# Patient Record
Sex: FEMALE | Race: OTHER | NOT HISPANIC OR LATINO
[De-identification: names, ages, dates, MRNs, and addresses within clinical notes are randomized per-mention and may not be internally consistent; named-entity substitution may affect disease eponyms.]

---

## 2019-06-11 ENCOUNTER — FORM ENCOUNTER (OUTPATIENT)
Age: 69
End: 2019-06-11

## 2019-12-17 ENCOUNTER — FORM ENCOUNTER (OUTPATIENT)
Age: 69
End: 2019-12-17

## 2020-06-23 ENCOUNTER — FORM ENCOUNTER (OUTPATIENT)
Age: 70
End: 2020-06-23

## 2021-01-28 PROBLEM — Z00.00 ENCOUNTER FOR PREVENTIVE HEALTH EXAMINATION: Status: ACTIVE | Noted: 2021-01-28

## 2021-01-29 DIAGNOSIS — Z80.7 FAMILY HISTORY OF OTHER MALIGNANT NEOPLASMS OF LYMPHOID, HEMATOPOIETIC AND RELATED TISSUES: ICD-10-CM

## 2021-01-29 DIAGNOSIS — Z87.898 PERSONAL HISTORY OF OTHER SPECIFIED CONDITIONS: ICD-10-CM

## 2021-01-29 DIAGNOSIS — Z87.891 PERSONAL HISTORY OF NICOTINE DEPENDENCE: ICD-10-CM

## 2021-01-29 DIAGNOSIS — J45.909 UNSPECIFIED ASTHMA, UNCOMPLICATED: ICD-10-CM

## 2021-01-29 DIAGNOSIS — Z78.9 OTHER SPECIFIED HEALTH STATUS: ICD-10-CM

## 2021-01-29 RX ORDER — SULINDAC 200 MG/1
TABLET ORAL
Refills: 0 | Status: ACTIVE | COMMUNITY

## 2021-01-29 RX ORDER — ALPRAZOLAM 2 MG/1
TABLET ORAL
Refills: 0 | Status: ACTIVE | COMMUNITY

## 2021-01-29 RX ORDER — FLUTICASONE PROPION/SALMETEROL 500-50 MCG
BLISTER, WITH INHALATION DEVICE INHALATION
Refills: 0 | Status: ACTIVE | COMMUNITY

## 2021-01-30 DIAGNOSIS — Z92.29 PERSONAL HISTORY OF OTHER DRUG THERAPY: ICD-10-CM

## 2021-02-02 ENCOUNTER — APPOINTMENT (OUTPATIENT)
Dept: BREAST CENTER | Facility: CLINIC | Age: 71
End: 2021-02-02

## 2021-02-08 ENCOUNTER — APPOINTMENT (OUTPATIENT)
Dept: BREAST CENTER | Facility: CLINIC | Age: 71
End: 2021-02-08
Payer: MEDICARE

## 2021-02-08 VITALS
SYSTOLIC BLOOD PRESSURE: 140 MMHG | BODY MASS INDEX: 20.49 KG/M2 | HEART RATE: 78 BPM | WEIGHT: 120 LBS | HEIGHT: 64 IN | DIASTOLIC BLOOD PRESSURE: 80 MMHG

## 2021-02-08 DIAGNOSIS — N60.12 DIFFUSE CYSTIC MASTOPATHY OF LEFT BREAST: ICD-10-CM

## 2021-02-08 DIAGNOSIS — Z87.09 PERSONAL HISTORY OF OTHER DISEASES OF THE RESPIRATORY SYSTEM: ICD-10-CM

## 2021-02-08 PROCEDURE — 99072 ADDL SUPL MATRL&STAF TM PHE: CPT

## 2021-02-08 PROCEDURE — 99214 OFFICE O/P EST MOD 30 MIN: CPT

## 2021-02-08 RX ORDER — TAMOXIFEN CITRATE 20 MG/1
20 TABLET, FILM COATED ORAL
Refills: 0 | Status: ACTIVE | COMMUNITY

## 2021-02-08 NOTE — PAST MEDICAL HISTORY
[Postmenopausal] : The patient is postmenopausal [Menarche Age ____] : age at menarche was [unfilled] [Menopause Age____] : age at menopause was [unfilled] [History of Hormone Replacement Treatment] : has no history of hormone replacement treatment [Total Preg ___] : G[unfilled] [Live Births ___] : P[unfilled]  [Age At Live Birth ___] : Age at live birth: [unfilled]

## 2021-02-08 NOTE — ASSESSMENT
[FreeTextEntry1] : The patient is a 71-year-old G1, P1 postmenopausal white female with some Ashkenazi Druze heritage on her paternal side.  She has no family history of breast or ovarian cancer but has a questionable history of Hodgkin's lymphoma in her paternal uncle.  She underwent menarche at age 13 and had her first child at age 36.  She underwent menopause at age 55 and never took any hormone replacement therapy.  She underwent a screening mammography and ultrasound on June 6, 2018 at [facility] which showed a new density measuring 8 mm towards the upper outer aspect of the right breast.  Ultrasound showed a 9 x 9 x 5 mm density in the right breast 10:00 region 12 cm from the nipple.  An ultrasound-guided core biopsy performed on June 12, 2018 came back as a  moderately differentiated invasive duct cancer which was ER positive at 99%, [place] positive at 84%, and HER-2/na negative.  She underwent a breast MRI on June 20, 2018 which showed localized disease in the right breast with no suspicious adenopathy.  She was seen by Dr. Padgett preoperatively for entrance in the TARGIT US trial but refused.  She underwent a right breast partial mastectomy and sentinel lymph node biopsy on July 3, 2018 and final pathology showed an 8 mm well differentiated invasive duct cancer with 2 negative sentinel lymph nodes and free margins.  This remained ER/NY positive HER-2/na negative and was a pathologic prognostic stage IA breast cancer.  She saw Dr. Lopez for a medical oncology evaluation and ended up following up with Dr. Roshni Balderas and saw Dr. Urbina who gave her the option of no radiation and she was just placed on tamoxifen.  On exam today, she has no evidence of recurrence in the right breast and left breast is negative.  She underwent her last bilateral mammography and ultrasound at [place] on June 24, 2020 just showing postsurgical changes in the right breast.  She was reassured and should follow-up again in 6 months and will be due for her bilateral mammography and ultrasound again around that time in July 2021 she should remain on tamoxifen.

## 2021-02-08 NOTE — REASON FOR VISIT
[Follow-Up: _____] : a [unfilled] follow-up visit [FreeTextEntry1] : She comes in with a history of a right breast upper outer quadrant partial mastectomy and sentinel lymph node biopsy performed on July 3, 2018.  Final pathology showed an 8 mm well differentiated invasive duct cancer with 2 negative lymph nodes and free margins which was ER/WV positive HER-2/na negative making this a T1b N0 M0 stage IA breast cancer.  She was placed on tamoxifen.

## 2021-02-08 NOTE — PHYSICAL EXAM
[Normocephalic] : normocephalic [Atraumatic] : atraumatic [Supple] : supple [EOMI] : extra ocular movement intact [No Supraclavicular Adenopathy] : no supraclavicular adenopathy [No Cervical Adenopathy] : no cervical adenopathy [Examined in the supine and seated position] : examined in the supine and seated position [Normal Sinus Rhythm] : normal sinus rhythm [Symmetrical] : symmetrical [No dominant masses] : no dominant masses in right breast  [No dominant masses] : no dominant masses left breast [No Nipple Retraction] : no left nipple retraction [No Nipple Discharge] : no left nipple discharge [Breast Mass Right Breast ___cm] : no masses [Breast Mass Left Breast ___cm] : no masses [Breast Nipple Inversion] : nipples not inverted [Breast Nipple Retraction] : nipples not retracted [Breast Nipple Flattening] : nipples not flattened [Breast Nipple Fissures] : nipples not fissured [No Axillary Lymphadenopathy] : no left axillary lymphadenopathy [Soft] : abdomen soft [Normal Bowel Sounds] : normal bowel sounds  [No Edema] : no edema [No Rashes] : no rashes [No Ulceration] : no ulceration [de-identified] : On exam, she has a well-healed wide excision scar in the upper outer aspect of the right breast.  She has ptotic C-cup breasts.  She has no evidence of recurrence.  She has no axillary, supraclavicular, or cervical adenopathy. [de-identified] : Well-healed right breast upper outer quadrant wide excision scar

## 2021-02-08 NOTE — HISTORY OF PRESENT ILLNESS
[FreeTextEntry1] : The patient is a 71-year-old G1, P1 postmenopausal white female with some Ashkenazi Shinto heritage on her paternal side.  She has no family history of breast or ovarian cancer but has a questionable history of Hodgkin's lymphoma in her paternal uncle.  She underwent menarche at age 13 and had her first child at age 36.  She underwent menopause at age 55 and never took any hormone replacement therapy.  She underwent a screening mammography and ultrasound on June 6, 2018 at [facility] which showed a new density measuring 8 mm towards the upper outer aspect of the right breast.  Ultrasound showed a 9 x 9 x 5 mm density in the right breast 10:00 region 12 cm from the nipple.  An ultrasound-guided core biopsy performed on June 12, 2018 came back as a  moderately differentiated invasive duct cancer which was ER positive at 99%, ME positive at 84%, and HER-2/na negative.  She underwent a breast MRI on June 20, 2018 which showed localized disease in the right breast with no suspicious adenopathy.  She was seen by Dr. Padgett preoperatively for entrance in the TARGIT US trial but refused.  She underwent a right breast partial mastectomy and sentinel lymph node biopsy on July 3, 2018 and final pathology showed an 8 mm well differentiated invasive duct cancer with 2 negative sentinel lymph nodes and free margins.  This remained ER/ME positive HER-2/na negative and was a pathologic prognostic stage IA breast cancer.  She saw Dr. Lopez for a medical oncology evaluation and ended up following up with Dr. Roshni Balderas and saw Dr. Urbina who gave her the option of no radiation and she was just placed on tamoxifen.

## 2021-07-21 ENCOUNTER — RESULT REVIEW (OUTPATIENT)
Age: 71
End: 2021-07-21

## 2021-07-21 ENCOUNTER — APPOINTMENT (OUTPATIENT)
Dept: BREAST CENTER | Facility: CLINIC | Age: 71
End: 2021-07-21
Payer: MEDICARE

## 2021-07-21 PROCEDURE — 99072 ADDL SUPL MATRL&STAF TM PHE: CPT

## 2021-07-21 PROCEDURE — 99213 OFFICE O/P EST LOW 20 MIN: CPT

## 2021-07-21 NOTE — HISTORY OF PRESENT ILLNESS
[FreeTextEntry1] : The patient is a 71-year-old G1, P1 postmenopausal white female with some Ashkenazi Religious heritage on her paternal side.  She has no family history of breast or ovarian cancer but has a questionable history of Hodgkin's lymphoma in her paternal uncle.  She underwent menarche at age 13 and had her first child at age 36.  She underwent menopause at age 55 and never took any hormone replacement therapy.  She underwent a screening mammography and ultrasound on June 6, 2018 at [facility] which showed a new density measuring 8 mm towards the upper outer aspect of the right breast.  Ultrasound showed a 9 x 9 x 5 mm density in the right breast 10:00 region 12 cm from the nipple.  An ultrasound-guided core biopsy performed on June 12, 2018 came back as a  moderately differentiated invasive duct cancer which was ER positive at 99%, DE positive at 84%, and HER-2/na negative.  She underwent a breast MRI on June 20, 2018 which showed localized disease in the right breast with no suspicious adenopathy.  She was seen by Dr. Padgett preoperatively for entrance in the TARGIT US trial but refused.  She underwent a right breast partial mastectomy and sentinel lymph node biopsy on July 3, 2018 and final pathology showed an 8 mm well differentiated invasive duct cancer with 2 negative sentinel lymph nodes and free margins.  This remained ER/DE positive HER-2/na negative and was a pathologic prognostic stage IA breast cancer.  She saw Dr. Lopez for a medical oncology evaluation and ended up following up with Dr. Roshni Balderas and saw Dr. Urbina who gave her the option of no radiation and she was just placed on tamoxifen.

## 2021-07-21 NOTE — ASSESSMENT
[FreeTextEntry1] : The patient is a 71-year-old G1, P1 postmenopausal white female with some Ashkenazi Sabianism heritage on her paternal side.  She has no family history of breast or ovarian cancer but has a questionable history of Hodgkin's lymphoma in her paternal uncle.  She underwent menarche at age 13 and had her first child at age 36.  She underwent menopause at age 55 and never took any hormone replacement therapy.  She underwent a screening mammography and ultrasound on June 6, 2018 at [facility] which showed a new density measuring 8 mm towards the upper outer aspect of the right breast.  Ultrasound showed a 9 x 9 x 5 mm density in the right breast 10:00 region 12 cm from the nipple.  An ultrasound-guided core biopsy performed on June 12, 2018 came back as a  moderately differentiated invasive duct cancer which was ER positive at 99%, ME positive at 84%, and HER-2/na negative.  She underwent a breast MRI on June 20, 2018 which showed localized disease in the right breast with no suspicious adenopathy.  She was seen by Dr. Padgett preoperatively for entrance in the TARGIT US trial but refused.  She underwent a right breast partial mastectomy and sentinel lymph node biopsy on July 3, 2018 and final pathology showed an 8 mm well differentiated invasive duct cancer with 2 negative sentinel lymph nodes and free margins.  This remained ER/ME positive HER-2/na negative and was a pathologic prognostic stage IA breast cancer.  She saw Dr. Lopez for a medical oncology evaluation and ended up following up with Dr. Roshni Balderas and saw Dr. Urbina who gave her the option of no radiation and she was just placed on tamoxifen.  On exam today, she has no evidence of recurrence in the right breast and left breast is negative.  She underwent her last bilateral mammography and ultrasound today here at [place] on July 21, 2021 showing the postop changes in the right breast and no suspicious findings.  She was reassured and should follow-up again in 6 months and will be due for her bilateral mammography and ultrasound again in July 2022.  She should remain on tamoxifen.

## 2021-07-21 NOTE — REASON FOR VISIT
[Follow-Up: _____] : a [unfilled] follow-up visit [FreeTextEntry1] : She comes in with a history of a right breast upper outer quadrant partial mastectomy and sentinel lymph node biopsy performed on July 3, 2018.  Final pathology showed an 8 mm well differentiated invasive duct cancer with 2 negative lymph nodes and free margins which was ER/OK positive HER-2/na negative making this a T1b N0 M0 stage IA breast cancer.  She was placed on tamoxifen.

## 2021-07-21 NOTE — PHYSICAL EXAM
[Normocephalic] : normocephalic [Atraumatic] : atraumatic [EOMI] : extra ocular movement intact [Supple] : supple [No Supraclavicular Adenopathy] : no supraclavicular adenopathy [No Cervical Adenopathy] : no cervical adenopathy [Normal Sinus Rhythm] : normal sinus rhythm [Examined in the supine and seated position] : examined in the supine and seated position [Symmetrical] : symmetrical [No dominant masses] : no dominant masses in right breast  [No dominant masses] : no dominant masses left breast [No Nipple Retraction] : no left nipple retraction [No Nipple Discharge] : no left nipple discharge [Breast Mass Right Breast ___cm] : no masses [Breast Mass Left Breast ___cm] : no masses [No Axillary Lymphadenopathy] : no left axillary lymphadenopathy [No Edema] : no edema [No Rashes] : no rashes [No Ulceration] : no ulceration [Breast Nipple Inversion] : nipples not inverted [Breast Nipple Retraction] : nipples not retracted [Breast Nipple Flattening] : nipples not flattened [Breast Nipple Fissures] : nipples not fissured [Breast Abnormal Lactation (Galactorrhea)] : no galactorrhea [Breast Abnormal Secretion Bloody Fluid] : no bloody discharge [Breast Abnormal Secretion Serous Fluid] : no serous discharge [Breast Abnormal Secretion Opalescent Fluid] : no milky discharge [de-identified] : On exam, she has a well-healed wide excision scar in the upper outer aspect of the right breast.  She has ptotic C-cup breasts.  She has no evidence of recurrence.  She has no axillary, supraclavicular, or cervical adenopathy. [de-identified] : Well-healed right breast upper outer quadrant wide excision scar

## 2022-01-01 NOTE — PHYSICAL EXAM
[Normocephalic] : normocephalic [Atraumatic] : atraumatic [EOMI] : extra ocular movement intact [Supple] : supple [No Supraclavicular Adenopathy] : no supraclavicular adenopathy [No Cervical Adenopathy] : no cervical adenopathy [Normal Sinus Rhythm] : normal sinus rhythm [Examined in the supine and seated position] : examined in the supine and seated position [Symmetrical] : symmetrical [No dominant masses] : no dominant masses in right breast  [No dominant masses] : no dominant masses left breast [No Nipple Retraction] : no left nipple retraction [No Nipple Discharge] : no left nipple discharge [Breast Mass Right Breast ___cm] : no masses [Breast Mass Left Breast ___cm] : no masses [Breast Nipple Inversion] : nipples not inverted [Breast Nipple Retraction] : nipples not retracted [Breast Nipple Flattening] : nipples not flattened [Breast Nipple Fissures] : nipples not fissured [Breast Abnormal Lactation (Galactorrhea)] : no galactorrhea [Breast Abnormal Secretion Bloody Fluid] : no bloody discharge [Breast Abnormal Secretion Serous Fluid] : no serous discharge [Breast Abnormal Secretion Opalescent Fluid] : no milky discharge [No Axillary Lymphadenopathy] : no left axillary lymphadenopathy [No Edema] : no edema [No Rashes] : no rashes [No Ulceration] : no ulceration [de-identified] : On exam, she has a well-healed wide excision scar in the upper outer aspect of the right breast.  She has ptotic C-cup breasts.  She has no evidence of recurrence.  She has no axillary, supraclavicular, or cervical adenopathy. [de-identified] : Well-healed right breast upper outer quadrant wide excision scar

## 2022-01-01 NOTE — REASON FOR VISIT
[Follow-Up: _____] : a [unfilled] follow-up visit [FreeTextEntry1] : She comes in with a history of a right breast upper outer quadrant partial mastectomy and sentinel lymph node biopsy performed on July 3, 2018.  Final pathology showed an 8 mm well differentiated invasive duct cancer with 2 negative lymph nodes and free margins which was ER/VA positive HER-2/na negative making this a T1b N0 M0 stage IA breast cancer.  She was placed on tamoxifen.

## 2022-01-01 NOTE — HISTORY OF PRESENT ILLNESS
[FreeTextEntry1] : The patient is a 71-year-old G1, P1 postmenopausal white female with some Ashkenazi Jainism heritage on her paternal side.  She has no family history of breast or ovarian cancer but has a questionable history of Hodgkin's lymphoma in her paternal uncle.  She underwent menarche at age 13 and had her first child at age 36.  She underwent menopause at age 55 and never took any hormone replacement therapy.  She underwent a screening mammography and ultrasound on June 6, 2018 at [facility] which showed a new density measuring 8 mm towards the upper outer aspect of the right breast.  Ultrasound showed a 9 x 9 x 5 mm density in the right breast 10:00 region 12 cm from the nipple.  An ultrasound-guided core biopsy performed on June 12, 2018 came back as a  moderately differentiated invasive duct cancer which was ER positive at 99%, AK positive at 84%, and HER-2/na negative.  She underwent a breast MRI on June 20, 2018 which showed localized disease in the right breast with no suspicious adenopathy.  She was seen by Dr. Padgett preoperatively for entrance in the TARGIT US trial but refused.  She underwent a right breast partial mastectomy and sentinel lymph node biopsy on July 3, 2018 and final pathology showed an 8 mm well differentiated invasive duct cancer with 2 negative sentinel lymph nodes and free margins.  This remained ER/AK positive HER-2/na negative and was a pathologic prognostic stage IA breast cancer.  She saw Dr. Lopez for a medical oncology evaluation and ended up following up with Dr. Roshni Balderas and saw Dr. Urbina who gave her the option of no radiation and she was just placed on tamoxifen.

## 2022-01-01 NOTE — ASSESSMENT
[FreeTextEntry1] : The patient is a 71-year-old G1, P1 postmenopausal white female with some Ashkenazi Baptist heritage on her paternal side.  She has no family history of breast or ovarian cancer but has a questionable history of Hodgkin's lymphoma in her paternal uncle.  She underwent menarche at age 13 and had her first child at age 36.  She underwent menopause at age 55 and never took any hormone replacement therapy.  She underwent a screening mammography and ultrasound on June 6, 2018 at [facility] which showed a new density measuring 8 mm towards the upper outer aspect of the right breast.  Ultrasound showed a 9 x 9 x 5 mm density in the right breast 10:00 region 12 cm from the nipple.  An ultrasound-guided core biopsy performed on June 12, 2018 came back as a  moderately differentiated invasive duct cancer which was ER positive at 99%, FL positive at 84%, and HER-2/na negative.  She underwent a breast MRI on June 20, 2018 which showed localized disease in the right breast with no suspicious adenopathy.  She was seen by Dr. Padgett preoperatively for entrance in the TARGIT US trial but refused.  She underwent a right breast partial mastectomy and sentinel lymph node biopsy on July 3, 2018 and final pathology showed an 8 mm well differentiated invasive duct cancer with 2 negative sentinel lymph nodes and free margins.  This remained ER/FL positive HER-2/na negative and was a pathologic prognostic stage IA breast cancer.  She saw Dr. Lopez for a medical oncology evaluation and ended up following up with Dr. Roshni Balderas and saw Dr. Urbina who gave her the option of no radiation and she was just placed on tamoxifen.  On exam today, she has no evidence of recurrence in the right breast and left breast is negative.  She underwent her last bilateral mammography and ultrasound which was reviewed from  July 21, 2021 performed at [place] showing postop changes in the right breast and no suspicious findings.  She was reassured and should follow-up again in 6 months and will be due for her bilateral mammography and ultrasound again in July 2022.  She should remain on tamoxifen.

## 2022-01-03 ENCOUNTER — APPOINTMENT (OUTPATIENT)
Dept: BREAST CENTER | Facility: CLINIC | Age: 72
End: 2022-01-03
Payer: MEDICARE

## 2022-02-23 ENCOUNTER — APPOINTMENT (OUTPATIENT)
Dept: BREAST CENTER | Facility: CLINIC | Age: 72
End: 2022-02-23
Payer: MEDICARE

## 2022-02-23 VITALS — OXYGEN SATURATION: 97 % | HEART RATE: 76 BPM

## 2022-02-23 PROCEDURE — 99213 OFFICE O/P EST LOW 20 MIN: CPT

## 2022-02-23 NOTE — ASSESSMENT
[FreeTextEntry1] : The patient is a 72-year-old G1, P1 postmenopausal white female with some Ashkenazi Scientologist heritage on her paternal side.  She has no family history of breast or ovarian cancer but has a questionable history of Hodgkin's lymphoma in her paternal uncle.  She underwent menarche at age 13 and had her first child at age 36.  She underwent menopause at age 55 and never took any hormone replacement therapy.  She underwent a screening mammography and ultrasound on June 6, 2018 at [facility] which showed a new density measuring 8 mm towards the upper outer aspect of the right breast.  Ultrasound showed a 9 x 9 x 5 mm density in the right breast 10:00 region 12 cm from the nipple.  An ultrasound-guided core biopsy performed on June 12, 2018 came back as a  moderately differentiated invasive duct cancer which was ER positive at 99%, IL positive at 84%, and HER-2/na negative.  She underwent a breast MRI on June 20, 2018 which showed localized disease in the right breast with no suspicious adenopathy.  She was seen by Dr. Padgett preoperatively for entrance in the TARGIT US trial but refused.  She underwent a right breast partial mastectomy and sentinel lymph node biopsy on July 3, 2018 and final pathology showed an 8 mm well differentiated invasive duct cancer with 2 negative sentinel lymph nodes and free margins.  This remained ER/IL positive HER-2/na negative and was a pathologic prognostic stage IA breast cancer.  She saw Dr. Lopez for a medical oncology evaluation and ended up following up with Dr. Roshni Balderas and saw Dr. Urbina who gave her the option of no radiation and she was just placed on tamoxifen.  On exam today, she has no evidence of recurrence in the right breast and left breast is negative.  She underwent her last bilateral mammography and ultrasound which was reviewed from  July 21, 2021 performed at [place] showing postop changes in the right breast and no suspicious findings.  She was reassured and should follow-up again in 6 months and will be due for her bilateral mammography and ultrasound again in July 2022.  She should remain on tamoxifen.  She is now seeing Dr. Vaughan at [facility].

## 2022-02-23 NOTE — PHYSICAL EXAM
[Normocephalic] : normocephalic [Atraumatic] : atraumatic [EOMI] : extra ocular movement intact [Supple] : supple [No Supraclavicular Adenopathy] : no supraclavicular adenopathy [Normal Sinus Rhythm] : normal sinus rhythm [No Cervical Adenopathy] : no cervical adenopathy [Examined in the supine and seated position] : examined in the supine and seated position [Symmetrical] : symmetrical [No dominant masses] : no dominant masses in right breast  [No dominant masses] : no dominant masses left breast [No Nipple Retraction] : no left nipple retraction [No Nipple Discharge] : no left nipple discharge [Breast Mass Left Breast ___cm] : no masses [Breast Mass Right Breast ___cm] : no masses [No Axillary Lymphadenopathy] : no left axillary lymphadenopathy [No Edema] : no edema [No Rashes] : no rashes [No Ulceration] : no ulceration [Breast Nipple Inversion] : nipples not inverted [Breast Nipple Retraction] : nipples not retracted [Breast Nipple Flattening] : nipples not flattened [Breast Nipple Fissures] : nipples not fissured [Breast Abnormal Lactation (Galactorrhea)] : no galactorrhea [Breast Abnormal Secretion Bloody Fluid] : no bloody discharge [Breast Abnormal Secretion Serous Fluid] : no serous discharge [Breast Abnormal Secretion Opalescent Fluid] : no milky discharge [de-identified] : On exam, she has a well-healed wide excision scar in the upper outer aspect of the right breast.  She has ptotic C-cup breasts.  She has no evidence of recurrence.  She has no axillary, supraclavicular, or cervical adenopathy. [de-identified] : Well-healed right breast upper outer quadrant wide excision scar

## 2022-02-23 NOTE — HISTORY OF PRESENT ILLNESS
[FreeTextEntry1] : The patient is a 72-year-old G1, P1 postmenopausal white female with some Ashkenazi Worship heritage on her paternal side.  She has no family history of breast or ovarian cancer but has a questionable history of Hodgkin's lymphoma in her paternal uncle.  She underwent menarche at age 13 and had her first child at age 36.  She underwent menopause at age 55 and never took any hormone replacement therapy.  She underwent a screening mammography and ultrasound on June 6, 2018 at [facility] which showed a new density measuring 8 mm towards the upper outer aspect of the right breast.  Ultrasound showed a 9 x 9 x 5 mm density in the right breast 10:00 region 12 cm from the nipple.  An ultrasound-guided core biopsy performed on June 12, 2018 came back as a  moderately differentiated invasive duct cancer which was ER positive at 99%, FL positive at 84%, and HER-2/na negative.  She underwent a breast MRI on June 20, 2018 which showed localized disease in the right breast with no suspicious adenopathy.  She was seen by Dr. Padgett preoperatively for entrance in the TARGIT US trial but refused.  She underwent a right breast partial mastectomy and sentinel lymph node biopsy on July 3, 2018 and final pathology showed an 8 mm well differentiated invasive duct cancer with 2 negative sentinel lymph nodes and free margins.  This remained ER/FL positive HER-2/na negative and was a pathologic prognostic stage IA breast cancer.  She saw Dr. Lopez for a medical oncology evaluation and ended up following up with Dr. Roshni Balderas and saw Dr. Urbina who gave her the option of no radiation and she was just placed on tamoxifen.

## 2022-02-23 NOTE — REASON FOR VISIT
[Follow-Up: _____] : a [unfilled] follow-up visit [FreeTextEntry1] : She comes in with a history of a right breast upper outer quadrant partial mastectomy and sentinel lymph node biopsy performed on July 3, 2018.  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  She was placed on tamoxifen.

## 2022-09-12 ENCOUNTER — RESULT REVIEW (OUTPATIENT)
Age: 72
End: 2022-09-12

## 2022-09-13 ENCOUNTER — NON-APPOINTMENT (OUTPATIENT)
Age: 72
End: 2022-09-13

## 2022-09-13 ENCOUNTER — APPOINTMENT (OUTPATIENT)
Dept: BREAST CENTER | Facility: CLINIC | Age: 72
End: 2022-09-13

## 2022-09-13 VITALS — BODY MASS INDEX: 21.17 KG/M2 | WEIGHT: 124 LBS | HEIGHT: 64 IN

## 2022-09-13 DIAGNOSIS — R92.2 INCONCLUSIVE MAMMOGRAM: ICD-10-CM

## 2022-09-13 PROCEDURE — 99213 OFFICE O/P EST LOW 20 MIN: CPT

## 2022-09-13 NOTE — ASSESSMENT
[FreeTextEntry1] : The patient is a 72-year-old G1, P1 postmenopausal white female with some Ashkenazi Latter-day heritage on her paternal side.  She has no family history of breast or ovarian cancer but has a questionable history of Hodgkin's lymphoma in her paternal uncle.  She underwent menarche at age 13 and had her first child at age 36.  She underwent menopause at age 55 and never took any hormone replacement therapy.  She underwent a screening mammography and ultrasound on June 6, 2018 at [facility] which showed a new density measuring 8 mm towards the upper outer aspect of the right breast.  Ultrasound showed a 9 x 9 x 5 mm density in the right breast 10:00 region 12 cm from the nipple.  An ultrasound-guided core biopsy performed on June 12, 2018 came back as a  moderately differentiated invasive duct cancer which was ER positive at 99%, PA positive at 84%, and HER-2/na negative.  She underwent a breast MRI on June 20, 2018 which showed localized disease in the right breast with no suspicious adenopathy.  She was seen by Dr. Padgett preoperatively for entrance in the TARGIT US trial but refused.  She underwent a right breast partial mastectomy and sentinel lymph node biopsy on July 3, 2018 and final pathology showed an 8 mm well differentiated invasive duct cancer with 2 negative sentinel lymph nodes and free margins.  This remained ER/PA positive HER-2/na negative and was a pathologic prognostic stage IA breast cancer.  She saw Dr. Lopez for a medical oncology evaluation and ended up following up with Dr. Roshni Balderas and saw Dr. Urbina who gave her the option of no radiation and she was just placed on tamoxifen.  On exam today, she has no evidence of recurrence in the right breast and left breast is negative.  She underwent her last bilateral mammography and ultrasound which was reviewed from today on September 13, 2022 performed at [facility] showing postop changes in the right breast and no suspicious findings.  She was reassured and should follow-up again in 6 months and will be due for her bilateral mammography and ultrasound again in September 2023.  She should remain on tamoxifen.  She is now seeing Dr. Vaughan at [facility] and remains on tamoxifen.

## 2022-09-13 NOTE — PHYSICAL EXAM
[Normocephalic] : normocephalic [Atraumatic] : atraumatic [EOMI] : extra ocular movement intact [Supple] : supple [No Supraclavicular Adenopathy] : no supraclavicular adenopathy [No Cervical Adenopathy] : no cervical adenopathy [Normal Sinus Rhythm] : normal sinus rhythm [Examined in the supine and seated position] : examined in the supine and seated position [Symmetrical] : symmetrical [No dominant masses] : no dominant masses in right breast  [No dominant masses] : no dominant masses left breast [No Nipple Retraction] : no left nipple retraction [No Nipple Discharge] : no left nipple discharge [Breast Mass Right Breast ___cm] : no masses [Breast Mass Left Breast ___cm] : no masses [No Axillary Lymphadenopathy] : no left axillary lymphadenopathy [No Edema] : no edema [No Rashes] : no rashes [No Ulceration] : no ulceration [Breast Nipple Inversion] : nipples not inverted [Breast Nipple Retraction] : nipples not retracted [Breast Nipple Flattening] : nipples not flattened [Breast Nipple Fissures] : nipples not fissured [Breast Abnormal Lactation (Galactorrhea)] : no galactorrhea [Breast Abnormal Secretion Bloody Fluid] : no bloody discharge [Breast Abnormal Secretion Serous Fluid] : no serous discharge [Breast Abnormal Secretion Opalescent Fluid] : no milky discharge [de-identified] : On exam, she has a well-healed wide excision scar in the upper outer aspect of the right breast.  She has ptotic C-cup breasts.  She has no evidence of recurrence.  She has no axillary, supraclavicular, or cervical adenopathy. [de-identified] : Well-healed right breast upper outer quadrant wide excision scar

## 2022-09-13 NOTE — REASON FOR VISIT
[Follow-Up: _____] : a [unfilled] follow-up visit [FreeTextEntry1] : She comes in with a history of a right breast upper outer quadrant partial mastectomy and sentinel lymph node biopsy performed on July 3, 2018.  Final pathology showed an 8 mm well differentiated invasive duct cancer with 2 negative lymph nodes and free margins which was ER/MI positive HER-2/na negative making this a T1b N0 M0 stage IA breast cancer.  She was placed on tamoxifen.

## 2022-09-13 NOTE — HISTORY OF PRESENT ILLNESS
[FreeTextEntry1] : The patient is a 72-year-old G1, P1 postmenopausal white female with some Ashkenazi Quaker heritage on her paternal side.  She has no family history of breast or ovarian cancer but has a questionable history of Hodgkin's lymphoma in her paternal uncle.  She underwent menarche at age 13 and had her first child at age 36.  She underwent menopause at age 55 and never took any hormone replacement therapy.  She underwent a screening mammography and ultrasound on June 6, 2018 at [facility] which showed a new density measuring 8 mm towards the upper outer aspect of the right breast.  Ultrasound showed a 9 x 9 x 5 mm density in the right breast 10:00 region 12 cm from the nipple.  An ultrasound-guided core biopsy performed on June 12, 2018 came back as a  moderately differentiated invasive duct cancer which was ER positive at 99%, KY positive at 84%, and HER-2/na negative.  She underwent a breast MRI on June 20, 2018 which showed localized disease in the right breast with no suspicious adenopathy.  She was seen by Dr. Padgett preoperatively for entrance in the TARGIT US trial but refused.  She underwent a right breast partial mastectomy and sentinel lymph node biopsy on July 3, 2018 and final pathology showed an 8 mm well differentiated invasive duct cancer with 2 negative sentinel lymph nodes and free margins.  This remained ER/KY positive HER-2/na negative and was a pathologic prognostic stage IA breast cancer.  She saw Dr. Lopez for a medical oncology evaluation and ended up following up with Dr. Roshni Balderas and saw Dr. Urbina who gave her the option of no radiation and she was just placed on tamoxifen.

## 2022-11-09 ENCOUNTER — OFFICE (OUTPATIENT)
Dept: URBAN - METROPOLITAN AREA CLINIC 121 | Facility: CLINIC | Age: 72
Setting detail: OPHTHALMOLOGY
End: 2022-11-09
Payer: MEDICARE

## 2022-11-09 DIAGNOSIS — H35.413: ICD-10-CM

## 2022-11-09 DIAGNOSIS — H43.813: ICD-10-CM

## 2022-11-09 DIAGNOSIS — H26.491: ICD-10-CM

## 2022-11-09 DIAGNOSIS — H57.03: ICD-10-CM

## 2022-11-09 DIAGNOSIS — H16.223: ICD-10-CM

## 2022-11-09 DIAGNOSIS — H35.341: ICD-10-CM

## 2022-11-09 DIAGNOSIS — Z96.1: ICD-10-CM

## 2022-11-09 DIAGNOSIS — H35.013: ICD-10-CM

## 2022-11-09 DIAGNOSIS — D31.30: ICD-10-CM

## 2022-11-09 DIAGNOSIS — H43.393: ICD-10-CM

## 2022-11-09 PROCEDURE — 92012 INTRM OPH EXAM EST PATIENT: CPT | Performed by: OPHTHALMOLOGY

## 2022-11-09 PROCEDURE — 92250 FUNDUS PHOTOGRAPHY W/I&R: CPT | Performed by: OPHTHALMOLOGY

## 2022-11-09 ASSESSMENT — REFRACTION_MANIFEST
OS_CYLINDER: +0.25
OS_AXIS: 074
OD_AXIS: 043
OD_CYLINDER: +0.50
OS_VA1: 20/70
OS_SPHERE: -7.25
OD_SPHERE: -8.00

## 2022-11-09 ASSESSMENT — KERATOMETRY
OD_AXISANGLE_DEGREES: 068
OS_AXISANGLE_DEGREES: 132
OS_K2POWER_DIOPTERS: 45.50
METHOD_AUTO_MANUAL: AUTO
OS_K1POWER_DIOPTERS: 45.00
OD_K2POWER_DIOPTERS: 45.50
OD_K1POWER_DIOPTERS: 44.50

## 2022-11-09 ASSESSMENT — AXIALLENGTH_DERIVED
OD_AL: 26.3488
OD_AL: 23.1939
OS_AL: 23.1993
OS_AL: 25.9371

## 2022-11-09 ASSESSMENT — REFRACTION_CURRENTRX
OD_SPHERE: -8.00
OD_CYLINDER: +0.50
OS_AXIS: 074
OS_CYLINDER: +0.25
OS_SPHERE: -7.25
OD_OVR_VA: 20/
OD_AXIS: 043
OS_OVR_VA: 20/

## 2022-11-09 ASSESSMENT — VISUAL ACUITY
OS_BCVA: 20/30
OD_BCVA: 20/25

## 2022-11-09 ASSESSMENT — REFRACTION_AUTOREFRACTION
OD_AXIS: 036
OD_CYLINDER: +0.75
OD_SPHERE: -0.75
OS_SPHERE: -1.00
OS_AXIS: 157
OS_CYLINDER: +0.75

## 2022-11-09 ASSESSMENT — CONFRONTATIONAL VISUAL FIELD TEST (CVF)
OS_FINDINGS: FULL
OD_FINDINGS: FULL

## 2022-11-09 ASSESSMENT — SUPERFICIAL PUNCTATE KERATITIS (SPK)
OD_SPK: T
OS_SPK: T

## 2022-11-09 ASSESSMENT — SPHEQUIV_DERIVED
OS_SPHEQUIV: -0.625
OS_SPHEQUIV: -7.125
OD_SPHEQUIV: -7.75
OD_SPHEQUIV: -0.375

## 2023-03-13 ENCOUNTER — OFFICE (OUTPATIENT)
Dept: URBAN - METROPOLITAN AREA CLINIC 121 | Facility: CLINIC | Age: 73
Setting detail: OPHTHALMOLOGY
End: 2023-03-13
Payer: MEDICARE

## 2023-03-13 DIAGNOSIS — H35.013: ICD-10-CM

## 2023-03-13 DIAGNOSIS — D31.30: ICD-10-CM

## 2023-03-13 DIAGNOSIS — H35.411: ICD-10-CM

## 2023-03-13 DIAGNOSIS — H35.341: ICD-10-CM

## 2023-03-13 DIAGNOSIS — H16.223: ICD-10-CM

## 2023-03-13 DIAGNOSIS — H26.491: ICD-10-CM

## 2023-03-13 DIAGNOSIS — H57.03: ICD-10-CM

## 2023-03-13 DIAGNOSIS — H43.813: ICD-10-CM

## 2023-03-13 DIAGNOSIS — H43.393: ICD-10-CM

## 2023-03-13 DIAGNOSIS — Z96.1: ICD-10-CM

## 2023-03-13 PROCEDURE — 99213 OFFICE O/P EST LOW 20 MIN: CPT | Performed by: OPHTHALMOLOGY

## 2023-03-13 PROCEDURE — 92134 CPTRZ OPH DX IMG PST SGM RTA: CPT | Performed by: OPHTHALMOLOGY

## 2023-03-13 ASSESSMENT — REFRACTION_CURRENTRX
OS_AXIS: 074
OD_CYLINDER: +0.50
OD_OVR_VA: 20/
OS_CYLINDER: +0.25
OD_SPHERE: -8.00
OD_AXIS: 043
OS_SPHERE: -7.25
OS_OVR_VA: 20/

## 2023-03-13 ASSESSMENT — PACHYMETRY
OD_CT_UM: 534
OD_CT_CORRECTION: 1
OS_CT_CORRECTION: 0
OS_CT_UM: 549

## 2023-03-13 ASSESSMENT — REFRACTION_MANIFEST
OS_AXIS: 074
OS_CYLINDER: +0.25
OD_CYLINDER: +0.50
OS_VA1: 20/70
OD_AXIS: 043
OS_SPHERE: -7.25
OD_SPHERE: -8.00

## 2023-03-13 ASSESSMENT — CONFRONTATIONAL VISUAL FIELD TEST (CVF)
OD_FINDINGS: FULL
OS_FINDINGS: FULL

## 2023-03-13 ASSESSMENT — KERATOMETRY
OD_AXISANGLE_DEGREES: 068
OS_K1POWER_DIOPTERS: 45.00
OD_K2POWER_DIOPTERS: 45.50
OS_K2POWER_DIOPTERS: 45.50
OD_K1POWER_DIOPTERS: 44.50
METHOD_AUTO_MANUAL: AUTO
OS_AXISANGLE_DEGREES: 132

## 2023-03-13 ASSESSMENT — AXIALLENGTH_DERIVED
OS_AL: 23.1993
OD_AL: 23.1939
OS_AL: 25.9371
OD_AL: 26.3488

## 2023-03-13 ASSESSMENT — REFRACTION_AUTOREFRACTION
OD_AXIS: 036
OS_AXIS: 157
OD_SPHERE: -0.75
OD_CYLINDER: +0.75
OS_SPHERE: -1.00
OS_CYLINDER: +0.75

## 2023-03-13 ASSESSMENT — VISUAL ACUITY
OS_BCVA: 20/30
OD_BCVA: 20/25

## 2023-03-13 ASSESSMENT — SPHEQUIV_DERIVED
OS_SPHEQUIV: -0.625
OS_SPHEQUIV: -7.125
OD_SPHEQUIV: -0.375
OD_SPHEQUIV: -7.75

## 2023-03-13 ASSESSMENT — TONOMETRY
OS_IOP_MMHG: 14
OD_IOP_MMHG: 16

## 2023-03-13 ASSESSMENT — SUPERFICIAL PUNCTATE KERATITIS (SPK)
OS_SPK: T
OD_SPK: T

## 2023-09-13 ENCOUNTER — OFFICE (OUTPATIENT)
Dept: URBAN - METROPOLITAN AREA CLINIC 121 | Facility: CLINIC | Age: 73
Setting detail: OPHTHALMOLOGY
End: 2023-09-13
Payer: MEDICARE

## 2023-09-13 ENCOUNTER — RESULT REVIEW (OUTPATIENT)
Age: 73
End: 2023-09-13

## 2023-09-13 DIAGNOSIS — H35.013: ICD-10-CM

## 2023-09-13 DIAGNOSIS — D31.30: ICD-10-CM

## 2023-09-13 DIAGNOSIS — H57.03: ICD-10-CM

## 2023-09-13 DIAGNOSIS — H43.393: ICD-10-CM

## 2023-09-13 DIAGNOSIS — H43.813: ICD-10-CM

## 2023-09-13 DIAGNOSIS — H16.223: ICD-10-CM

## 2023-09-13 DIAGNOSIS — Z96.1: ICD-10-CM

## 2023-09-13 DIAGNOSIS — H35.411: ICD-10-CM

## 2023-09-13 DIAGNOSIS — H35.341: ICD-10-CM

## 2023-09-13 DIAGNOSIS — H26.491: ICD-10-CM

## 2023-09-13 PROCEDURE — 92250 FUNDUS PHOTOGRAPHY W/I&R: CPT | Performed by: OPHTHALMOLOGY

## 2023-09-13 PROCEDURE — 92014 COMPRE OPH EXAM EST PT 1/>: CPT | Performed by: OPHTHALMOLOGY

## 2023-09-13 ASSESSMENT — REFRACTION_MANIFEST
OD_SPHERE: -8.00
OS_CYLINDER: +0.25
OS_VA1: 20/70
OS_SPHERE: -7.25
OD_AXIS: 043
OS_AXIS: 074
OD_CYLINDER: +0.50

## 2023-09-13 ASSESSMENT — CONFRONTATIONAL VISUAL FIELD TEST (CVF)
OS_FINDINGS: FULL
OD_FINDINGS: FULL

## 2023-09-13 ASSESSMENT — KERATOMETRY
OS_K2POWER_DIOPTERS: 45.50
OS_K1POWER_DIOPTERS: 45.00
OD_AXISANGLE_DEGREES: 076
OD_K1POWER_DIOPTERS: 44.75
METHOD_AUTO_MANUAL: AUTO
OS_AXISANGLE_DEGREES: 143
OD_K2POWER_DIOPTERS: 46.25

## 2023-09-13 ASSESSMENT — TONOMETRY
OS_IOP_MMHG: 14
OD_IOP_MMHG: 14

## 2023-09-13 ASSESSMENT — REFRACTION_CURRENTRX
OS_AXIS: 074
OD_CYLINDER: +0.50
OD_SPHERE: -8.00
OD_OVR_VA: 20/
OS_SPHERE: -7.25
OS_CYLINDER: +0.25
OS_OVR_VA: 20/
OD_AXIS: 043

## 2023-09-13 ASSESSMENT — REFRACTION_AUTOREFRACTION
OD_SPHERE: -1.00
OS_SPHERE: -1.50
OS_CYLINDER: +0.50
OS_AXIS: 115
OD_CYLINDER: +0.75
OD_AXIS: 043

## 2023-09-13 ASSESSMENT — SPHEQUIV_DERIVED
OS_SPHEQUIV: -1.25
OD_SPHEQUIV: -0.625
OS_SPHEQUIV: -7.125
OD_SPHEQUIV: -7.75

## 2023-09-13 ASSESSMENT — SUPERFICIAL PUNCTATE KERATITIS (SPK)
OD_SPK: T
OS_SPK: T

## 2023-09-13 ASSESSMENT — VISUAL ACUITY
OS_BCVA: 20/30
OD_BCVA: 20/25

## 2023-09-13 ASSESSMENT — PACHYMETRY
OD_CT_UM: 534
OD_CT_CORRECTION: 1
OS_CT_UM: 549
OS_CT_CORRECTION: 0

## 2023-09-13 ASSESSMENT — AXIALLENGTH_DERIVED
OD_AL: 26.1217
OS_AL: 23.4371
OS_AL: 25.9371
OD_AL: 23.1108

## 2023-09-21 ENCOUNTER — NON-APPOINTMENT (OUTPATIENT)
Age: 73
End: 2023-09-21

## 2023-09-21 ENCOUNTER — APPOINTMENT (OUTPATIENT)
Dept: BREAST CENTER | Facility: CLINIC | Age: 73
End: 2023-09-21
Payer: MEDICARE

## 2023-09-21 VITALS
OXYGEN SATURATION: 97 % | WEIGHT: 128 LBS | HEART RATE: 77 BPM | DIASTOLIC BLOOD PRESSURE: 80 MMHG | HEIGHT: 64 IN | SYSTOLIC BLOOD PRESSURE: 140 MMHG | BODY MASS INDEX: 21.85 KG/M2

## 2023-09-21 DIAGNOSIS — Z85.3 PERSONAL HISTORY OF MALIGNANT NEOPLASM OF BREAST: ICD-10-CM

## 2023-09-21 DIAGNOSIS — Z90.11 ACQUIRED ABSENCE OF RIGHT BREAST AND NIPPLE: ICD-10-CM

## 2023-09-21 DIAGNOSIS — N60.12 DIFFUSE CYSTIC MASTOPATHY OF LEFT BREAST: ICD-10-CM

## 2023-09-21 DIAGNOSIS — N60.11 DIFFUSE CYSTIC MASTOPATHY OF LEFT BREAST: ICD-10-CM

## 2023-09-21 PROCEDURE — 99213 OFFICE O/P EST LOW 20 MIN: CPT

## 2024-03-04 ENCOUNTER — OFFICE (OUTPATIENT)
Dept: URBAN - METROPOLITAN AREA CLINIC 121 | Facility: CLINIC | Age: 74
Setting detail: OPHTHALMOLOGY
End: 2024-03-04
Payer: MEDICARE

## 2024-03-04 DIAGNOSIS — H35.341: ICD-10-CM

## 2024-03-04 DIAGNOSIS — H16.223: ICD-10-CM

## 2024-03-04 DIAGNOSIS — H26.491: ICD-10-CM

## 2024-03-04 DIAGNOSIS — Z96.1: ICD-10-CM

## 2024-03-04 DIAGNOSIS — H43.393: ICD-10-CM

## 2024-03-04 DIAGNOSIS — H35.013: ICD-10-CM

## 2024-03-04 DIAGNOSIS — H43.813: ICD-10-CM

## 2024-03-04 DIAGNOSIS — D31.30: ICD-10-CM

## 2024-03-04 DIAGNOSIS — H57.03: ICD-10-CM

## 2024-03-04 DIAGNOSIS — H35.411: ICD-10-CM

## 2024-03-04 PROCEDURE — 99213 OFFICE O/P EST LOW 20 MIN: CPT | Performed by: OPHTHALMOLOGY

## 2024-03-04 PROCEDURE — 92134 CPTRZ OPH DX IMG PST SGM RTA: CPT | Performed by: OPHTHALMOLOGY

## 2024-03-04 ASSESSMENT — REFRACTION_CURRENTRX
OS_OVR_VA: 20/
OS_AXIS: 074
OD_CYLINDER: +0.50
OS_SPHERE: -7.25
OS_CYLINDER: +0.25
OD_OVR_VA: 20/
OD_AXIS: 043
OD_SPHERE: -8.00

## 2024-03-04 ASSESSMENT — REFRACTION_MANIFEST
OD_SPHERE: -8.00
OS_VA1: 20/70
OS_SPHERE: -7.25
OD_CYLINDER: +0.50
OS_AXIS: 074
OS_CYLINDER: +0.25
OD_AXIS: 043

## 2024-03-04 ASSESSMENT — SPHEQUIV_DERIVED
OS_SPHEQUIV: -7.125
OD_SPHEQUIV: -7.75

## 2025-01-16 ENCOUNTER — NON-APPOINTMENT (OUTPATIENT)
Age: 75
End: 2025-01-16

## 2025-02-12 ENCOUNTER — APPOINTMENT (OUTPATIENT)
Dept: BREAST CENTER | Facility: CLINIC | Age: 75
End: 2025-02-12
Payer: MEDICARE

## 2025-02-12 ENCOUNTER — RESULT REVIEW (OUTPATIENT)
Age: 75
End: 2025-02-12

## 2025-02-12 VITALS — HEIGHT: 64 IN | WEIGHT: 127 LBS | BODY MASS INDEX: 21.68 KG/M2 | HEART RATE: 92 BPM | OXYGEN SATURATION: 97 %

## 2025-02-12 DIAGNOSIS — Z85.3 PERSONAL HISTORY OF MALIGNANT NEOPLASM OF BREAST: ICD-10-CM

## 2025-02-12 DIAGNOSIS — N60.12 DIFFUSE CYSTIC MASTOPATHY OF LEFT BREAST: ICD-10-CM

## 2025-02-12 DIAGNOSIS — N60.11 DIFFUSE CYSTIC MASTOPATHY OF LEFT BREAST: ICD-10-CM

## 2025-02-12 DIAGNOSIS — R92.323 MAMMOGRAPHIC FIBROGLANDULAR DENSITY, BILATERAL BREASTS: ICD-10-CM

## 2025-02-12 DIAGNOSIS — Z90.11 ACQUIRED ABSENCE OF RIGHT BREAST AND NIPPLE: ICD-10-CM

## 2025-02-12 DIAGNOSIS — Z12.31 ENCOUNTER FOR SCREENING MAMMOGRAM FOR MALIGNANT NEOPLASM OF BREAST: ICD-10-CM

## 2025-02-12 PROCEDURE — 99213 OFFICE O/P EST LOW 20 MIN: CPT

## 2025-04-04 ENCOUNTER — OFFICE (OUTPATIENT)
Facility: LOCATION | Age: 75
Setting detail: OPHTHALMOLOGY
End: 2025-04-04
Payer: MEDICARE

## 2025-04-04 DIAGNOSIS — D31.30: ICD-10-CM

## 2025-04-04 DIAGNOSIS — H35.411: ICD-10-CM

## 2025-04-04 DIAGNOSIS — H57.03: ICD-10-CM

## 2025-04-04 DIAGNOSIS — H43.393: ICD-10-CM

## 2025-04-04 DIAGNOSIS — Z96.1: ICD-10-CM

## 2025-04-04 DIAGNOSIS — H35.013: ICD-10-CM

## 2025-04-04 DIAGNOSIS — H26.493: ICD-10-CM

## 2025-04-04 DIAGNOSIS — H43.813: ICD-10-CM

## 2025-04-04 DIAGNOSIS — H16.223: ICD-10-CM

## 2025-04-04 DIAGNOSIS — H35.341: ICD-10-CM

## 2025-04-04 PROCEDURE — 99214 OFFICE O/P EST MOD 30 MIN: CPT | Performed by: OPHTHALMOLOGY

## 2025-04-04 PROCEDURE — 92250 FUNDUS PHOTOGRAPHY W/I&R: CPT | Performed by: OPHTHALMOLOGY

## 2025-04-04 ASSESSMENT — KERATOMETRY
OS_AXISANGLE_DEGREES: 131
OD_K1POWER_DIOPTERS: 44.50
OS_K1POWER_DIOPTERS: 45.00
METHOD_AUTO_MANUAL: AUTO
OD_K2POWER_DIOPTERS: 46.25
OS_K2POWER_DIOPTERS: 45.25
OD_AXISANGLE_DEGREES: 079

## 2025-04-04 ASSESSMENT — CONFRONTATIONAL VISUAL FIELD TEST (CVF)
OD_FINDINGS: FULL
OS_FINDINGS: FULL

## 2025-04-04 ASSESSMENT — REFRACTION_CURRENTRX
OD_CYLINDER: +0.50
OS_CYLINDER: +0.25
OS_OVR_VA: 20/
OD_OVR_VA: 20/
OS_SPHERE: -7.25
OD_SPHERE: -8.00
OS_AXIS: 074
OD_AXIS: 043

## 2025-04-04 ASSESSMENT — PACHYMETRY
OD_CT_UM: 534
OS_CT_CORRECTION: 0
OD_CT_CORRECTION: 1
OS_CT_UM: 549

## 2025-04-04 ASSESSMENT — SUPERFICIAL PUNCTATE KERATITIS (SPK)
OD_SPK: T
OS_SPK: T

## 2025-04-04 ASSESSMENT — TONOMETRY
OS_IOP_MMHG: 16
OD_IOP_MMHG: 16

## 2025-04-04 ASSESSMENT — REFRACTION_AUTOREFRACTION
OD_CYLINDER: +0.50
OS_CYLINDER: +0.50
OS_AXIS: 159
OS_SPHERE: -0.75
OD_SPHERE: -0.75
OD_AXIS: 055

## 2025-04-04 ASSESSMENT — REFRACTION_MANIFEST
OS_SPHERE: -7.25
OS_VA1: 20/70
OD_SPHERE: -8.00
OD_AXIS: 043
OS_CYLINDER: +0.25
OS_AXIS: 074
OD_CYLINDER: +0.50

## 2025-04-04 ASSESSMENT — VISUAL ACUITY
OS_BCVA: 20/25
OD_BCVA: 20/30